# Patient Record
Sex: MALE | Race: WHITE | Employment: OTHER | URBAN - METROPOLITAN AREA
[De-identification: names, ages, dates, MRNs, and addresses within clinical notes are randomized per-mention and may not be internally consistent; named-entity substitution may affect disease eponyms.]

---

## 2024-08-22 PROCEDURE — 88305 TISSUE EXAM BY PATHOLOGIST: CPT

## 2024-08-22 PROCEDURE — 0753T DGTZ GLS MCRSCP SLD LEVEL IV: CPT

## 2024-08-22 PROCEDURE — 88305 TISSUE EXAM BY PATHOLOGIST: CPT | Performed by: PATHOLOGY

## 2024-08-23 ENCOUNTER — LAB REQUISITION (OUTPATIENT)
Dept: LAB | Facility: HOSPITAL | Age: 79
End: 2024-08-23
Payer: MEDICARE

## 2024-08-23 DIAGNOSIS — K21.9 GASTRO-ESOPHAGEAL REFLUX DISEASE WITHOUT ESOPHAGITIS: ICD-10-CM

## 2024-08-30 LAB
LABORATORY COMMENT REPORT: NORMAL
PATH REPORT.FINAL DX SPEC: NORMAL
PATH REPORT.GROSS SPEC: NORMAL
PATH REPORT.MICROSCOPIC SPEC OTHER STN: NORMAL
PATH REPORT.RELEVANT HX SPEC: NORMAL
PATH REPORT.TOTAL CANCER: NORMAL

## 2024-12-23 ENCOUNTER — HOSPITAL ENCOUNTER (OUTPATIENT)
Dept: CARDIOLOGY | Facility: CLINIC | Age: 79
Discharge: HOME | End: 2024-12-23
Payer: MEDICARE

## 2024-12-23 DIAGNOSIS — R01.1 HEART MURMUR: ICD-10-CM

## 2024-12-23 DIAGNOSIS — I49.9 CARDIAC ARRHYTHMIA, UNSPECIFIED CARDIAC ARRHYTHMIA TYPE: ICD-10-CM

## 2024-12-23 LAB
AORTIC VALVE MEAN GRADIENT: 14 MMHG
AORTIC VALVE PEAK VELOCITY: 2.57 M/S
AV PEAK GRADIENT: 27 MMHG
AVA (PEAK VEL): 0.94 CM2
AVA (VTI): 0.89 CM2
EJECTION FRACTION APICAL 4 CHAMBER: 48
EJECTION FRACTION: 50 %
LEFT ATRIUM VOLUME AREA LENGTH INDEX BSA: 29.8 ML/M2
LEFT VENTRICLE INTERNAL DIMENSION DIASTOLE: 2.92 CM (ref 3.5–6)
LEFT VENTRICULAR OUTFLOW TRACT DIAMETER: 2.08 CM
MITRAL VALVE E/A RATIO: 0.84
RIGHT VENTRICLE FREE WALL PEAK S': 13 CM/S
RIGHT VENTRICLE PEAK SYSTOLIC PRESSURE: 30.2 MMHG
TRICUSPID ANNULAR PLANE SYSTOLIC EXCURSION: 1.7 CM

## 2024-12-23 PROCEDURE — 93306 TTE W/DOPPLER COMPLETE: CPT | Performed by: INTERNAL MEDICINE

## 2024-12-23 PROCEDURE — 93306 TTE W/DOPPLER COMPLETE: CPT

## 2024-12-23 PROCEDURE — 93246 EXT ECG>7D<15D RECORDING: CPT

## 2025-01-14 ENCOUNTER — TELEPHONE (OUTPATIENT)
Dept: CARDIOLOGY | Facility: CLINIC | Age: 80
End: 2025-01-14

## 2025-01-22 PROBLEM — E78.2 MIXED HYPERLIPIDEMIA: Status: ACTIVE | Noted: 2025-01-22

## 2025-01-22 PROBLEM — I49.3 PVC (PREMATURE VENTRICULAR CONTRACTION): Status: ACTIVE | Noted: 2025-01-22

## 2025-01-22 PROBLEM — I35.0 NONRHEUMATIC AORTIC VALVE STENOSIS: Status: ACTIVE | Noted: 2025-01-22

## 2025-01-22 PROBLEM — I47.10 PAROXYSMAL SUPRAVENTRICULAR TACHYCARDIA (CMS-HCC): Status: ACTIVE | Noted: 2025-01-22

## 2025-01-23 ENCOUNTER — OFFICE VISIT (OUTPATIENT)
Dept: CARDIOLOGY | Facility: CLINIC | Age: 80
End: 2025-01-23
Payer: MEDICARE

## 2025-01-23 VITALS — SYSTOLIC BLOOD PRESSURE: 100 MMHG | DIASTOLIC BLOOD PRESSURE: 62 MMHG | WEIGHT: 171 LBS | HEART RATE: 75 BPM

## 2025-01-23 DIAGNOSIS — E78.2 MIXED HYPERLIPIDEMIA: ICD-10-CM

## 2025-01-23 DIAGNOSIS — I35.0 NONRHEUMATIC AORTIC VALVE STENOSIS: ICD-10-CM

## 2025-01-23 DIAGNOSIS — I47.10 PAROXYSMAL SUPRAVENTRICULAR TACHYCARDIA (CMS-HCC): ICD-10-CM

## 2025-01-23 DIAGNOSIS — I49.3 PVC (PREMATURE VENTRICULAR CONTRACTION): Primary | ICD-10-CM

## 2025-01-23 PROCEDURE — 1160F RVW MEDS BY RX/DR IN RCRD: CPT | Performed by: INTERNAL MEDICINE

## 2025-01-23 PROCEDURE — 99214 OFFICE O/P EST MOD 30 MIN: CPT | Performed by: INTERNAL MEDICINE

## 2025-01-23 PROCEDURE — 1036F TOBACCO NON-USER: CPT | Performed by: INTERNAL MEDICINE

## 2025-01-23 PROCEDURE — 1159F MED LIST DOCD IN RCRD: CPT | Performed by: INTERNAL MEDICINE

## 2025-01-23 PROCEDURE — 99204 OFFICE O/P NEW MOD 45 MIN: CPT | Performed by: INTERNAL MEDICINE

## 2025-01-23 RX ORDER — NEOMYCIN/POLYMYXIN B/PRAMOXINE 3.5-10K-1
CREAM (GRAM) TOPICAL
COMMUNITY

## 2025-01-23 RX ORDER — FAMOTIDINE 20 MG/1
TABLET, FILM COATED ORAL
COMMUNITY

## 2025-01-23 RX ORDER — LINACLOTIDE 290 UG/1
1 CAPSULE, GELATIN COATED ORAL
COMMUNITY
Start: 2023-05-22

## 2025-01-23 RX ORDER — SIMVASTATIN 40 MG/1
40 TABLET, FILM COATED ORAL NIGHTLY
COMMUNITY
Start: 2024-06-05

## 2025-01-23 RX ORDER — GUAIFENESIN 600 MG/1
TABLET, EXTENDED RELEASE ORAL
COMMUNITY

## 2025-01-23 RX ORDER — FLUTICASONE PROPIONATE AND SALMETEROL 250; 50 UG/1; UG/1
1 POWDER RESPIRATORY (INHALATION) 2 TIMES DAILY
COMMUNITY

## 2025-01-23 RX ORDER — FINASTERIDE 5 MG/1
5 TABLET, FILM COATED ORAL
COMMUNITY
Start: 2024-10-28 | End: 2025-10-28

## 2025-01-23 RX ORDER — TADALAFIL 5 MG/1
1 TABLET ORAL
COMMUNITY
Start: 2024-11-19

## 2025-01-23 RX ORDER — TOPIRAMATE 100 MG/1
200 TABLET, FILM COATED ORAL 2 TIMES DAILY
COMMUNITY
Start: 2024-06-07

## 2025-01-23 RX ORDER — TAMSULOSIN HYDROCHLORIDE 0.4 MG/1
0.4 CAPSULE ORAL 2 TIMES DAILY
COMMUNITY

## 2025-02-03 ENCOUNTER — APPOINTMENT (OUTPATIENT)
Dept: CARDIOLOGY | Facility: CLINIC | Age: 80
End: 2025-02-03
Payer: MEDICARE

## 2025-07-24 ENCOUNTER — APPOINTMENT (OUTPATIENT)
Dept: CARDIOLOGY | Facility: CLINIC | Age: 80
End: 2025-07-24
Payer: MEDICARE

## 2025-07-30 ENCOUNTER — HOSPITAL ENCOUNTER (OUTPATIENT)
Dept: CARDIOLOGY | Facility: CLINIC | Age: 80
Discharge: HOME | End: 2025-07-30
Payer: MEDICARE

## 2025-07-30 ENCOUNTER — OFFICE VISIT (OUTPATIENT)
Dept: CARDIOLOGY | Facility: CLINIC | Age: 80
End: 2025-07-30
Payer: MEDICARE

## 2025-07-30 VITALS — HEART RATE: 84 BPM | WEIGHT: 161 LBS | DIASTOLIC BLOOD PRESSURE: 51 MMHG | SYSTOLIC BLOOD PRESSURE: 114 MMHG

## 2025-07-30 DIAGNOSIS — E78.2 MIXED HYPERLIPIDEMIA: ICD-10-CM

## 2025-07-30 DIAGNOSIS — I47.10 PAROXYSMAL SUPRAVENTRICULAR TACHYCARDIA: ICD-10-CM

## 2025-07-30 DIAGNOSIS — I35.0 NONRHEUMATIC AORTIC VALVE STENOSIS: ICD-10-CM

## 2025-07-30 DIAGNOSIS — I49.3 PVC (PREMATURE VENTRICULAR CONTRACTION): ICD-10-CM

## 2025-07-30 LAB
AORTIC VALVE MEAN GRADIENT: 15 MMHG
AORTIC VALVE PEAK VELOCITY: 2.75 M/S
AV PEAK GRADIENT: 30 MMHG
AVA (PEAK VEL): 1.06 CM2
AVA (VTI): 1.03 CM2
EJECTION FRACTION APICAL 4 CHAMBER: 50.9
EJECTION FRACTION: 53 %
LEFT ATRIUM VOLUME AREA LENGTH INDEX BSA: 22.3 ML/M2
LEFT VENTRICLE INTERNAL DIMENSION DIASTOLE: 3.75 CM (ref 3.5–6)
LEFT VENTRICULAR OUTFLOW TRACT DIAMETER: 2.11 CM
MITRAL VALVE E/A RATIO: 0.63
RIGHT VENTRICLE FREE WALL PEAK S': 13 CM/S
RIGHT VENTRICLE PEAK SYSTOLIC PRESSURE: 25 MMHG
TRICUSPID ANNULAR PLANE SYSTOLIC EXCURSION: 1.3 CM

## 2025-07-30 PROCEDURE — 93306 TTE W/DOPPLER COMPLETE: CPT

## 2025-07-30 PROCEDURE — 1036F TOBACCO NON-USER: CPT | Performed by: INTERNAL MEDICINE

## 2025-07-30 PROCEDURE — 1159F MED LIST DOCD IN RCRD: CPT | Performed by: INTERNAL MEDICINE

## 2025-07-30 PROCEDURE — 99214 OFFICE O/P EST MOD 30 MIN: CPT | Performed by: INTERNAL MEDICINE

## 2025-07-30 PROCEDURE — 99212 OFFICE O/P EST SF 10 MIN: CPT | Mod: 25

## 2025-07-30 PROCEDURE — 93306 TTE W/DOPPLER COMPLETE: CPT | Performed by: INTERNAL MEDICINE

## 2025-07-30 PROCEDURE — 1160F RVW MEDS BY RX/DR IN RCRD: CPT | Performed by: INTERNAL MEDICINE

## 2025-07-30 NOTE — PROGRESS NOTES
Chief Complaint:   Valve Disorder     History of Present Illness     Laure Leahy is a 79 y.o. male presenting with moderate to moderately-severe low flow low gradient aortic stenosis to evaluate a cardiac murmur.  The patient has been well since their last appointment and has no cardiac complaints.  The patient denies chest pain, near-syncope or syncope.  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  Exercises regularly without dyspnea.  No palpitations. Since last OV 1/23/25, he has been well.  Occasional DAY with carrying laundry up stairs unchanged - has COPD. No CP.  No syncope.    Review of Systems  All pertinent systems have been reviewed and are negative except for what is stated in the history of present illness.    All other systems have been reviewed and are negative and noncontributory to this patient's current ailments.  .       Previous History     Past Medical History:  He has a past medical history of Mixed hyperlipidemia (01/22/2025), Nonrheumatic aortic valve stenosis (01/22/2025), Paroxysmal supraventricular tachycardia (01/22/2025), and PVC (premature ventricular contraction) (01/22/2025).    Past Surgical History:  He has no past surgical history on file.      Social History:  He reports that he has never smoked. He has never used smokeless tobacco. No history on file for alcohol use and drug use.    Family History:  No family history on file.     Allergies:  Shrimp and Cetirizine    Outpatient Medications:  Current Outpatient Medications   Medication Instructions    famotidine (Pepcid) 20 mg tablet     finasteride (PROSCAR) 5 mg, Daily RT    fluticasone propion-salmeteroL (Advair Diskus) 250-50 mcg/dose diskus inhaler 1 puff, 2 times daily    guaiFENesin (Mucinex) 600 mg 12 hr tablet     Linzess 290 mcg capsule 1 capsule    melatonin 5 mg/15 mL liquid 1 tablet at bedtime as needed with food    simvastatin (ZOCOR) 40 mg, Nightly    tadalafil (Cialis)  5 mg tablet 1 tablet, Daily (0630)    tamsulosin (FLOMAX) 0.4 mg, 2 times daily    topiramate (TOPAMAX) 200 mg, 2 times daily       Physical Examination   Vitals:  Visit Vitals  /51 (BP Location: Right arm, Patient Position: Sitting, BP Cuff Size: Adult)   Pulse 84   Wt 73 kg (161 lb)   Smoking Status Never    Physical Exam  Vitals reviewed.   Constitutional:       General: He is not in acute distress.     Appearance: Normal appearance.   HENT:      Head: Normocephalic and atraumatic.      Nose: Nose normal.     Eyes:      Conjunctiva/sclera: Conjunctivae normal.       Cardiovascular:      Rate and Rhythm: Normal rate and regular rhythm.      Pulses: Normal pulses.      Heart sounds: Murmur heard.      Systolic murmur is present with a grade of 2/6.   Pulmonary:      Effort: Pulmonary effort is normal. No respiratory distress.      Breath sounds: Normal breath sounds. No wheezing, rhonchi or rales.   Abdominal:      General: Bowel sounds are normal. There is no distension.      Palpations: Abdomen is soft.      Tenderness: There is no abdominal tenderness.     Musculoskeletal:         General: No swelling.      Right lower leg: No edema.      Left lower leg: No edema.     Skin:     General: Skin is warm and dry.      Capillary Refill: Capillary refill takes less than 2 seconds.     Neurological:      General: No focal deficit present.      Mental Status: He is alert.     Psychiatric:         Mood and Affect: Mood normal.             Labs/Imaging/Cardiac Studies   I have personally reviewed the patient's available lab work, primary care appointment notes, pertinent imaging studies, and cardiac studies and have discussed them and my independent interpretation of those results with the patient and caregiver at this appointment.  All pertinent recent Emergency Department evaluations and Hospital admissions were also reviewed in detail with the patient and caregiver.      Reviewed Echo and last Echo,  Labs  Echo:  Transthoracic Echo (TTE) Complete  Result Date: 12/23/2024        [facility] [place], [facility]       [address] [address]        Tel [phone] and Fax [phone] TRANSTHORACIC ECHOCARDIOGRAM REPORT  Patient Name:       INDER HERNANDEZ           [place] Physician:    [id number] Alfredo Crouch MD Study Date:         12/23/2024          Ordering Provider:    [id number] DENNIS PETERS                                                               LESLIE MRN/PID:            [id number]            Fellow: Accession#:         [id number]        Nurse: Date of Birth/Age:  1945 / 79      Sonographer:          Sera EPPERSON,                     years                                     RDCS Gender assigned at  M                   Additional Staff: Birth: Height:             175.26 cm           Admit Date:           12/23/2024 Weight:             72.57 kg            Admission Status:     Outpatient BSA / BMI:          1.88 m2 / 23.63     Encounter#:           [id number]                     kg/m2 Blood Pressure:     124/76 mmHg         Department Location:  [facility] Study Type:    TRANSTHORACIC ECHO (TTE) COMPLETE Diagnosis/ICD: Cardiac murmur, unspecified-R01.1 Indication:    HM CPT Code:      Echo Complete w Full [id number]  Study Detail: The following Echo studies were performed: 2D, M-Mode, Doppler and               color flow. A bubble study was not performed.  PHYSICIAN INTERPRETATION: Left Ventricle: Left ventricular ejection fraction is mildly decreased, by visual estimate at 50%. There are no regional left ventricular wall motion abnormalities. The left ventricular cavity size is decreased. There is moderately increased septal and mildly increased posterior left ventricular wall thickness. There is left ventricular concentric remodeling. Spectral Doppler shows a  normal pattern of left ventricular diastolic filling. Left Atrium: The left atrium is normal in size. A bubble study using agitated saline was not performed. Right Ventricle: The right ventricle is normal in size. There is normal right ventricular global systolic function. Right Atrium: The right atrium is normal in size. Aortic Valve: The aortic valve was not well visualized. There is moderate aortic valve cusp calcification. There is reduced systolic aortic valve leaflet excursion. The aortic valve dimensionless index is 0.26. There is trace to mild aortic valve regurgitation. The peak instantaneous gradient of the aortic valve is 27 mmHg. The mean gradient of the aortic valve is 14 mmHg. There is paradoxical low flow, low gradient aortic stenosis present. Mitral Valve: The mitral valve is normal in structure. There is trace to mild mitral valve regurgitation. Tricuspid Valve: The tricuspid valve is structurally normal. There is mild tricuspid regurgitation. The Doppler estimated RVSP is slightly elevated at 30.2 mmHg. Pulmonic Valve: The pulmonic valve is not well visualized. There is no indication of pulmonic valve regurgitation. Pericardium: There is no pericardial effusion noted. Aorta: The aortic root is normal. Systemic Veins: The inferior vena cava was not well visualized. In comparison to the previous echocardiogram(s): There are no prior studies on this patient for comparison purposes.  CONCLUSIONS:  1. Left ventricular ejection fraction is mildly decreased, by visual estimate at 50%.  2. Left ventricular cavity size is decreased.  3. There is moderately increased septal thickness.  4. There is normal right ventricular global systolic function.  5. Slightly elevated right ventricular systolic pressure.  6. There is moderate aortic valve cusp calcification.  7. There is paradoxical low flow, low gradient aortic stenosis present. QUANTITATIVE DATA SUMMARY:  2D MEASUREMENTS:          Normal Ranges: LAs:              4.00 cm  (2.7-4.0cm) RVIDd:           2.29 cm  (0.9-3.6cm) IVSd:            1.44 cm  (0.6-1.1cm) LVPWd:           1.22 cm  (0.6-1.1cm) LVIDd:           2.92 cm  (3.9-5.9cm) LVIDs:           2.26 cm LV Mass Index:   66 g/m2 LVEDV Index:     39 ml/m2 LV % FS          22.3 %  LA VOLUME:                    Normal Ranges: LA Vol A4C:        50.8 ml    (22+/-6mL/m2) LA Vol A2C:        53.5 ml LA Vol BP:         56.0 ml LA Vol Index A4C:  27.1 ml/m2 LA Vol Index A2C:  28.5 ml/m2 LA Vol Index BP:   29.8 ml/m2 LA Area A4C:       17.3 cm2 LA Area A2C:       16.5 cm2 LA Major Axis A4C: 5.0 cm LA Major Axis A2C: 4.3 cm LA Vol A4C:        46.2 ml LA Vol A2C:        45.9 ml LA Vol Index BSA:  24.5 ml/m2  RA VOLUME BY A/L METHOD:            Normal Ranges: RA Vol A4C:              22.4 ml    (8.3-19.5ml) RA Vol Index A4C:        11.9 ml/m2 RA Area A4C:             11.6 cm2 RA Major Axis A4C:       5.1 cm  LV SYSTOLIC FUNCTION BY 2D PLANIMETRY (MOD):                      Normal Ranges: EF-A4C View:    48 % (>=55%) EF-A2C View:    60 % EF-Biplane:     54 % EF-Visual:      50 % LV EF Reported: 50 %  LV DIASTOLIC FUNCTION:           Normal Ranges: MV Peak E:             0.48 m/s  (0.7-1.2 m/s) MV Peak A:             0.57 m/s  (0.42-0.7 m/s) E/A Ratio:             0.84      (1.0-2.2) MV e'                  0.065 m/s (>8.0) MV lateral e'          0.08 m/s MV medial e'           0.05 m/s E/e' Ratio:            7.35      (<8.0)  MITRAL VALVE:          Normal Ranges: MV DT:        124 msec (150-240msec)  AORTIC VALVE:                      Normal Ranges: AoV Vmax:                2.57 m/s  (<=1.7m/s) AoV Peak P.5 mmHg (<20mmHg) AoV Mean P.1 mmHg (1.7-11.5mmHg) LVOT Max Nelson:            0.71 m/s  (<=1.1m/s) AoV VTI:                 53.91 cm  (18-25cm) LVOT VTI:                14.05 cm LVOT Diameter:           2.08 cm   (1.8-2.4cm) AoV Area, VTI:           0.89 cm2  (2.5-5.5cm2) AoV Area,Vmax:            0.94 cm2  (2.5-4.5cm2) AoV Dimensionless Index: 0.26  RIGHT VENTRICLE: RV Basal 3.25 cm RV Mid   2.29 cm RV Major 6.3 cm TAPSE:   17.3 mm RV s'    0.13 m/s  TRICUSPID VALVE/RVSP:          Normal Ranges: Peak TR Velocity:     2.61 m/s Est. RA Pressure:     3 mmHg RV Syst Pressure:     30 mmHg  (< 30mmHg)  PULMONIC VALVE:          Normal Ranges: PV Max Nelson:     0.9 m/s  (0.6-0.9m/s) PV Max PG:      3.3 mmHg  AORTA: Asc Ao Diam 3.25 cm  [id number] Alfredo Crouch MD Electronically signed on 12/23/2024 at 9:15:37 PM  ** Final **          Assessment and Recommendations     Assessment/Plan       1. PVC (premature ventricular contraction) (Primary)  ASX.  No indication  to Rx.    2. Paroxysmal supraventricular tachycardia (CMS-HCC)  ASX.  No indication to Rx.    3. Nonrheumatic aortic valve stenosis  The patient has unchanged moderate to severe low flow low gradient aortic stenosis and remains asymptomatic.  There is no indication for AVR.  The patient will continue to have serial annual echocardiography and was counseled on the expected symptoms related to aortic stenosis.  Weight lifting restrictions reviewed.      4. Mixed hyperlipidemia  The patient's lipids are reasonably well controlled on chronic simvastatin therapy and they are meeting their goal LDL cholesterol per the ACC/AHA guidelines.        Laure Leahy will return in 6 months for an office visit.         Norman Guerrero MD    Exclusive of any other services or procedures performed, I, Norman Guerrero MD , spent 30 minutes in duration for this visit today.  This time consisted of chart review, obtaining history, and/or performing the exam as documented above as well as documenting the clinical information for the encounter in the electronic record, discussing treatment options, plans, and/or goals with patient, family, and/or caregiver, refilling medications, updating the electronic record, ordering medicines, lab work, imaging, referrals, and/or procedures as documented  above and communicating with other helthcare professionals. I have discussed the results of laboratory, radiology, and cardiology studies with the patient and their family/caregiver.